# Patient Record
Sex: FEMALE | Race: WHITE | NOT HISPANIC OR LATINO | URBAN - METROPOLITAN AREA
[De-identification: names, ages, dates, MRNs, and addresses within clinical notes are randomized per-mention and may not be internally consistent; named-entity substitution may affect disease eponyms.]

---
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
## 2019-10-17 ENCOUNTER — HOSPITAL ENCOUNTER (EMERGENCY)
Facility: MEDICAL CENTER | Age: 19
End: 2019-10-17
Attending: EMERGENCY MEDICINE
Payer: COMMERCIAL

## 2019-10-17 VITALS
HEART RATE: 74 BPM | RESPIRATION RATE: 18 BRPM | TEMPERATURE: 98.6 F | DIASTOLIC BLOOD PRESSURE: 62 MMHG | WEIGHT: 130 LBS | SYSTOLIC BLOOD PRESSURE: 111 MMHG | OXYGEN SATURATION: 97 %

## 2019-10-17 DIAGNOSIS — T78.2XXA ANAPHYLAXIS, INITIAL ENCOUNTER: ICD-10-CM

## 2019-10-17 PROCEDURE — 96374 THER/PROPH/DIAG INJ IV PUSH: CPT

## 2019-10-17 PROCEDURE — 700111 HCHG RX REV CODE 636 W/ 250 OVERRIDE (IP)

## 2019-10-17 PROCEDURE — 96372 THER/PROPH/DIAG INJ SC/IM: CPT

## 2019-10-17 PROCEDURE — 96375 TX/PRO/DX INJ NEW DRUG ADDON: CPT

## 2019-10-17 PROCEDURE — 99285 EMERGENCY DEPT VISIT HI MDM: CPT

## 2019-10-17 PROCEDURE — 700111 HCHG RX REV CODE 636 W/ 250 OVERRIDE (IP): Performed by: EMERGENCY MEDICINE

## 2019-10-17 RX ORDER — EPINEPHRINE 1 MG/ML(1)
0.5 AMPUL (ML) INJECTION ONCE
Status: COMPLETED | OUTPATIENT
Start: 2019-10-17 | End: 2019-10-17

## 2019-10-17 RX ORDER — PREDNISONE 20 MG/1
20 TABLET ORAL DAILY
Qty: 6 TAB | Refills: 0 | Status: SHIPPED | OUTPATIENT
Start: 2019-10-18 | End: 2023-06-01

## 2019-10-17 RX ORDER — DIPHENHYDRAMINE HYDROCHLORIDE 50 MG/ML
50 INJECTION INTRAMUSCULAR; INTRAVENOUS ONCE
Status: COMPLETED | OUTPATIENT
Start: 2019-10-17 | End: 2019-10-17

## 2019-10-17 RX ORDER — FAMOTIDINE 20 MG/1
20 TABLET, FILM COATED ORAL 2 TIMES DAILY
Qty: 4 TAB | Refills: 0 | Status: SHIPPED | OUTPATIENT
Start: 2019-10-17 | End: 2019-10-19

## 2019-10-17 RX ORDER — METHYLPREDNISOLONE SODIUM SUCCINATE 125 MG/2ML
125 INJECTION, POWDER, LYOPHILIZED, FOR SOLUTION INTRAMUSCULAR; INTRAVENOUS ONCE
Status: COMPLETED | OUTPATIENT
Start: 2019-10-17 | End: 2019-10-17

## 2019-10-17 RX ORDER — ONDANSETRON 2 MG/ML
INJECTION INTRAMUSCULAR; INTRAVENOUS
Status: COMPLETED
Start: 2019-10-17 | End: 2019-10-17

## 2019-10-17 RX ORDER — ONDANSETRON 2 MG/ML
4 INJECTION INTRAMUSCULAR; INTRAVENOUS ONCE
Status: COMPLETED | OUTPATIENT
Start: 2019-10-17 | End: 2019-10-17

## 2019-10-17 RX ADMIN — FAMOTIDINE 20 MG: 10 INJECTION, SOLUTION INTRAVENOUS at 15:52

## 2019-10-17 RX ADMIN — METHYLPREDNISOLONE SODIUM SUCCINATE 125 MG: 125 INJECTION, POWDER, FOR SOLUTION INTRAMUSCULAR; INTRAVENOUS at 15:50

## 2019-10-17 RX ADMIN — DIPHENHYDRAMINE HYDROCHLORIDE 50 MG: 50 INJECTION, SOLUTION INTRAMUSCULAR; INTRAVENOUS at 15:50

## 2019-10-17 RX ADMIN — ONDANSETRON 4 MG: 2 INJECTION INTRAMUSCULAR; INTRAVENOUS at 16:00

## 2019-10-17 RX ADMIN — EPINEPHRINE 0.5 MG: 1 INJECTION INTRAMUSCULAR; INTRAVENOUS; SUBCUTANEOUS at 15:49

## 2019-10-17 NOTE — ED PROVIDER NOTES
ED Provider Note    Scribed for Ximena Colón M.D. by Holly Guzman. 10/17/2019, 3:49 PM.    Primary care provider: Pcp Pt States None  Means of arrival: Walk in  History obtained from: Patient  History limited by: None     CHIEF COMPLAINT  Chief Complaint   Patient presents with   • Allergic Reaction     HPI  Radhika Broussard is a 19 y.o. female who presents to the Emergency Department with an allergic reaction onset just prior to ED arrival after eating a pomegranate 1 hour ago. She states she felt fine for 25 minutes after consumption however subsequently began feeling nausea. She then experienced some throat swelling and difficulty breathing. Patient states she has no known allergies except to cats. She last ate pomegranate 8-10 months ago. Denies vomiting.     REVIEW OF SYSTEMS  Pertinent positives include allergic reaction, nausea, throat swelling, difficulty breathing.   Pertinent negatives include no emesis.     As above, all other systems reviewed and are negative.   See HPI for further details.     PAST MEDICAL HISTORY  Past Medical History:   Diagnosis Date   • Lyme disease      SURGICAL HISTORY  None noted    SOCIAL HISTORY  Social History     Tobacco Use   • Smoking status: Never Smoker   • Smokeless tobacco: Never Used   Substance Use Topics   • Alcohol use: No   • Drug use: No      Social History     Substance and Sexual Activity   Drug Use No       FAMILY HISTORY  History reviewed. No pertinent family history.    CURRENT MEDICATIONS  Home Medications     Reviewed by Divya Corral (Pharmacy Tech) on 10/17/19 at 1603  Med List Status: Complete   Medication Last Dose Status        Patient Juan Carlos Taking any Medications                       ALLERGIES  Allergies   Allergen Reactions   • Pomegranate [[place]] Anaphylaxis       PHYSICAL EXAM  VITAL SIGNS: /93   Pulse (!) 120   Temp 37.2 °C (99 °F) (Temporal)   Resp (!) 22   Wt 59 kg (130 lb)   LMP 09/14/2019   SpO2 96%   Vitals reviewed.    Pulse  Oximetry was obtained. It showed a reading of 96% on room air. I interpreted this as normal.  Consitutional: Well-developed, well-nourished.  Positive respiratory distress.  HENT: Normocephalic, right external ear normal, left external ear normal, oropharynx clear and moist. Edema to uvula and soft palate.   Eyes: Severe edema to bilateral upper and lower eyelids. Negative for: icterus.  Neck: Range of motion normal, supple. Negative for cervical adenopathy.  Cardiovascular: Normal rate, regular rhythm, heart sounds normal, intact distal pulses. Negative for: murmur, rub, gallop.  Pulmonary/Chest Wall: Moderate respiratory distress, tachypneic, inspiratory and expiratory wheezes. Negative for: rales, rhonchi.   Musculoskeletal: Normal range of motion. Negative for edema.  Normal gait   neurological: Alert and oriented x3. No focal deficits.  Skin: Warm, dry. Negative for rash.  Psych: Understandably anxious    COURSE & MEDICAL DECISION MAKING  Nursing notes, VS, PMSFHx reviewed in chart.    3:49 PM Patient seen and examined at bedside. The patient presents with anaphylactic reaction after eating pomegranate. She was emergently treated with 20 mg Pepcid, 125 mg Solu-medrol, 50 mg Benadryl, and 0.5 mg epinephrine injection. Patient reports decreased swelling to her eyes. We will continue to observe her in the ER until symptom resolution. She will additionally be treated with 4 mg Zofran.     4:19 PM Patient reevaluated at bedside. She is feeling much better.     5:13 PM Patient reevaluated at bedside. She is sleeping in bed. Lung sounds are clear to ascultation. We will continue to observe her to ensure no recurrent allergic reaction.     6:23 PM Patient reevaluated at bedside. She is sitting upright in bed. Patient reports she is feeling much better. Vitals are stable. I recommended her that she props herself up when sleeping tonight to decreased facial swelling. She will be discharged with prescription for EpiPen,  Pepcid 20 mg and Prednisone 20 mg and instructions on supportive care. Patient understands and is comfortable to discharge home. The patient will return for new or worsening symptoms such as throat swelling or difficulty breathing. She is stable at time of discharge. Her vitals prior to discharge are: /62   Pulse 74   Temp 37 °C (98.6 °F) (Temporal)   Resp 18   Wt 59 kg (130 lb)   LMP 09/14/2019   SpO2 97%       CRITICAL CARE  The very real possibilty of a deterioration of this patient's condition required the highest level of my preparedness for sudden, emergent intervention.  I provided critical care services, which included medication orders, frequent reevaluations of the patient's condition and response to treatment, ordering and reviewing test results, and discussing the case with various consultants.  The critical care time associated with the care of the patient was 35 minutes. Review chart for interventions. This time is exclusive of any other billable procedures.    DISPOSITION:  Patient will be discharged home in stable condition.    FOLLOW UP:  [facility], Emergency Dept  [address] [place] [id number] [phone]    As needed, If symptoms worsen      OUTPATIENT MEDICATIONS:  Discharge Medication List as of 10/17/2019  6:14 PM      START taking these medications    Details   predniSONE (DELTASONE) 20 MG Tab Take 1 Tab by mouth every day. Take 3 tabs p.o. daily x2 days, Disp-6 Tab, R-0, Normal      famotidine (PEPCID) 20 MG Tab Take 1 Tab by mouth 2 times a day for 2 days., Disp-4 Tab, R-0, Normal      EPINEPHrine 0.3 MG/0.3ML Solution Prefilled Syringe 0.3 mg by Intramuscular route Once PRN (for anaphylaxis) for up to 1 dose., Disp-2 Each, R-0, Normal           FINAL IMPRESSION  1. Anaphylaxis, initial encounter       Critical care time of 35 minutes. This time is exclusive of any other billable procedures.     I, Holly Guzman (Hannah), am scribing for, and in  the presence ofXimena M.D..  Electronically signed by: Holly Guzman (Scribe), 10/17/2019  I, Ximena Colón M.D. personally performed the services described in this documentation, as scribed by Holly Guzman in my presence, and it is both accurate and complete. C.     The note accurately reflects work and decisions made by me.  Ximena Colón  10/17/2019  9:08 PM

## 2019-10-17 NOTE — ED NOTES
Pt brought back from triage, c/o allergic reaction to pomegranate seeds. Denies hx of this. Pt had large amount of swelling to bilateral eyes, moderate increased WOB with inspiratory and expiratory wheezes noted. +cough    Pt a/o x4, speaking in 1-2 word sentences.

## 2023-05-23 ENCOUNTER — HOSPITAL ENCOUNTER (EMERGENCY)
Facility: MEDICAL CENTER | Age: 23
End: 2023-05-23
Attending: EMERGENCY MEDICINE

## 2023-05-23 VITALS
WEIGHT: 130 LBS | RESPIRATION RATE: 18 BRPM | HEART RATE: 59 BPM | SYSTOLIC BLOOD PRESSURE: 118 MMHG | HEIGHT: 65 IN | BODY MASS INDEX: 21.66 KG/M2 | OXYGEN SATURATION: 99 % | DIASTOLIC BLOOD PRESSURE: 76 MMHG | TEMPERATURE: 98.4 F

## 2023-05-23 DIAGNOSIS — T78.40XA ALLERGIC REACTION, INITIAL ENCOUNTER: ICD-10-CM

## 2023-05-23 PROCEDURE — 99284 EMERGENCY DEPT VISIT MOD MDM: CPT

## 2023-05-23 PROCEDURE — 700111 HCHG RX REV CODE 636 W/ 250 OVERRIDE (IP): Performed by: EMERGENCY MEDICINE

## 2023-05-23 PROCEDURE — 96374 THER/PROPH/DIAG INJ IV PUSH: CPT

## 2023-05-23 RX ORDER — DEXAMETHASONE SODIUM PHOSPHATE 4 MG/ML
10 INJECTION, SOLUTION INTRA-ARTICULAR; INTRALESIONAL; INTRAMUSCULAR; INTRAVENOUS; SOFT TISSUE ONCE
Status: COMPLETED | OUTPATIENT
Start: 2023-05-23 | End: 2023-05-23

## 2023-05-23 RX ADMIN — DEXAMETHASONE SODIUM PHOSPHATE 10 MG: 4 INJECTION INTRA-ARTICULAR; INTRALESIONAL; INTRAMUSCULAR; INTRAVENOUS; SOFT TISSUE at 17:58

## 2023-05-23 NOTE — ED TRIAGE NOTES
"Patient presents to the ER with the following complaints    Chief Complaint   Patient presents with    Allergic Reaction     Patient took 10 mg of liquid benadryl and 10 mg of liquid zyrtec prior to arrival. Aerosol dry shampoo was used just prior to onset of symptoms 2 and a half hours ago. Patient able to speak in complete sentences with mild to moderate swelling of tongue. Airway intact.      BP (!) 146/107   Pulse 100   Temp 36.9 °C (98.4 °F) (Temporal)   Resp 20   Ht 1.651 m (5' 5\")   Wt 59 kg (130 lb)   LMP 04/30/2023 (Approximate)   SpO2 100%   BMI 21.63 kg/m²       "

## 2023-05-24 NOTE — ED PROVIDER NOTES
ED Provider Note    CHIEF COMPLAINT  Chief Complaint   Patient presents with    Allergic Reaction     Patient took 10 mg of liquid benadryl and 10 mg of liquid zyrtec prior to arrival. Aerosol dry shampoo was used just prior to onset of symptoms 2 and a half hours ago. Patient able to speak in complete sentences with mild to moderate swelling of tongue. Airway intact.        EXTERNAL RECORDS REVIEWED  Outpatient Notes previous allergic reactions    HPI/ROS  LIMITATION TO HISTORY   Select: : None  OUTSIDE HISTORIAN(S):  None    Radhika Broussard is a 22 y.o. female who presents to the ED secondary to allergic reaction.  The patient states that after using some spray shampoo.  The patient has an extensive history of allergies.  The patient states that she started feeling very itchy in her throat, took 10 mg of Benadryl, 10 mg of Zyrtec.  She took a nap and when she woke up she felt like she had hives on her tongue.  Patient states her symptoms have then improved, symptoms started approximately 2 hours ago.  No other hives, chest pain, shortness of breath.    PAST MEDICAL HISTORY   has a past medical history of Lyme disease.    SURGICAL HISTORY  patient denies any surgical history    FAMILY HISTORY  History reviewed. No pertinent family history.    SOCIAL HISTORY  Social History     Tobacco Use    Smoking status: Never    Smokeless tobacco: Never   Substance and Sexual Activity    Alcohol use: Yes     Alcohol/week: 1.2 oz     Types: 2 Cans of beer per week    Drug use: No    Sexual activity: Not on file       CURRENT MEDICATIONS  Home Medications       Reviewed by Gaurang Lima R.N. (Registered Nurse) on 05/23/23 at 1636  Med List Status: Not Addressed     Medication Last Dose Status   EPINEPHrine 0.3 MG/0.3ML Solution Prefilled Syringe  Active   predniSONE (DELTASONE) 20 MG Tab  Active                    ALLERGIES  Allergies   Allergen Reactions    Pomegranate [[place]] Anaphylaxis       PHYSICAL EXAM  VITAL SIGNS:  "BP (!) 146/107   Pulse 100   Temp 36.9 °C (98.4 °F) (Temporal)   Resp 20   Ht 1.651 m (5' 5\")   Wt 59 kg (130 lb)   LMP 04/30/2023 (Approximate)   SpO2 100%   BMI 21.63 kg/m²    Well-developed well-nourished 22-year-old white female who appears in mild distress  Atraumatic, normocephalic, oropharynx with no lesions seen, no uvular edema, tonsils are not erythematous  Neck supple  Chest clear to auscultation  Regular rhythm  Abdomen soft nontender  Neuro awake screech is clear  Skin no hives seen    INITIAL ASSESSMENT, COURSE AND PLAN  Care Narrative: Patient with allergic reaction, symptoms of pretty much gone away now.  We will give the patient a dose of Decadron, she will take Benadryl at home, she has a EpiPen at home.  We will have the patient return with worsening symptoms.      DISPOSITION AND DISCUSSIONS    Escalation of care considered, and ultimately not performed:blood analysis    FINAL DIAGNOSIS  1. Allergic reaction, initial encounter           Electronically signed by: Samuel Nix M.D., 5/23/2023 6:00 PM      "

## 2023-06-01 ENCOUNTER — HOSPITAL ENCOUNTER (EMERGENCY)
Facility: MEDICAL CENTER | Age: 23
End: 2023-06-01
Attending: EMERGENCY MEDICINE
Payer: COMMERCIAL

## 2023-06-01 VITALS
HEART RATE: 62 BPM | DIASTOLIC BLOOD PRESSURE: 84 MMHG | OXYGEN SATURATION: 95 % | BODY MASS INDEX: 26.23 KG/M2 | SYSTOLIC BLOOD PRESSURE: 131 MMHG | RESPIRATION RATE: 16 BRPM | WEIGHT: 133.6 LBS | HEIGHT: 60 IN | TEMPERATURE: 98 F

## 2023-06-01 DIAGNOSIS — T78.40XA ALLERGIC REACTION, INITIAL ENCOUNTER: ICD-10-CM

## 2023-06-01 PROCEDURE — 99282 EMERGENCY DEPT VISIT SF MDM: CPT

## 2023-06-01 RX ORDER — ALBUTEROL SULFATE 90 UG/1
1-2 INHALANT RESPIRATORY (INHALATION) EVERY 4 HOURS PRN
COMMUNITY

## 2023-06-01 RX ORDER — DIPHENHYDRAMINE HCL 25 MG
25 TABLET ORAL EVERY 6 HOURS PRN
COMMUNITY

## 2023-06-01 RX ORDER — CETIRIZINE HYDROCHLORIDE 10 MG/1
10-20 TABLET ORAL 2 TIMES DAILY PRN
COMMUNITY

## 2023-06-01 RX ORDER — PREDNISONE 20 MG/1
40 TABLET ORAL DAILY
Qty: 10 TABLET | Refills: 0 | Status: SHIPPED | OUTPATIENT
Start: 2023-06-01 | End: 2023-06-06

## 2023-06-01 RX ORDER — FEXOFENADINE HCL 180 MG/1
180 TABLET ORAL DAILY
COMMUNITY

## 2023-06-01 RX ORDER — FLUTICASONE PROPIONATE AND SALMETEROL 113; 14 UG/1; UG/1
1 POWDER, METERED RESPIRATORY (INHALATION) 2 TIMES DAILY
COMMUNITY

## 2023-06-01 RX ORDER — FAMOTIDINE 20 MG/1
20-40 TABLET, FILM COATED ORAL 2 TIMES DAILY PRN
COMMUNITY

## 2023-06-01 NOTE — DISCHARGE INSTRUCTIONS
Take Benadryl as needed for further itching.  Utilize your EpiPen for difficulty with breathing or swallowing.

## 2023-06-01 NOTE — ED PROVIDER NOTES
ED Provider Note    CHIEF COMPLAINT  Chief Complaint   Patient presents with    Allergic Reaction     Started about an hour ago. Pt has taken benadryl, 4 zyrtec, 1 allegra and 2 pepcid without relief. Pt endorses sob and throat tightness and itchiness.         EXTERNAL RECORDS REVIEWED  Other reviewed the patient's records from 23 May when she was seen here for suspected allergic reaction at that time as well.    HPI/ROS    Radhika Broussard is a 22 y.o. female who presents for suspected allergic reaction.  The patient states that she started having allergic reaction about an hour prior to arrival.  The patient took Benadryl, 4 Zyrtec, 1 Allegra, and 2 Pepcid with no relief.  She states she feels like she is having hard time breathing as well as swelling.  She also has some numbness to the right leg.  She states that she has a known history of pollen allergies.  She does have an EpiPen but she did not utilize the EpiPen.  She states she is otherwise healthy.    PAST MEDICAL HISTORY   has a past medical history of Lyme disease and Patient denies medical problems.    SURGICAL HISTORY  patient denies any surgical history    FAMILY HISTORY  History reviewed. No pertinent family history.    SOCIAL HISTORY  Social History     Tobacco Use    Smoking status: Never    Smokeless tobacco: Never   Substance and Sexual Activity    Alcohol use: Yes     Alcohol/week: 1.2 oz     Types: 2 Cans of beer per week    Drug use: No    Sexual activity: Not on file       CURRENT MEDICATIONS  Home Medications       Reviewed by Andrea Whitney R.N. (Registered Nurse) on 06/01/23 at 1459  Med List Status: Not Addressed     Medication Last Dose Status   EPINEPHrine 0.3 MG/0.3ML Solution Prefilled Syringe  Active   predniSONE (DELTASONE) 20 MG Tab  Active                    ALLERGIES  Allergies   Allergen Reactions    Pomegranate [[place]] Anaphylaxis       PHYSICAL EXAM  VITAL SIGNS: Pulse 77   Temp 36.7 °C (98.1 °F) (Temporal)   Resp (!) 24     1.524 m (5')   Wt 60.6 kg (133 lb 9.6 oz)   LMP 04/30/2023 (Approximate)   SpO2 98%   Breastfeeding No   BMI 26.09 kg/m²    In general the patient appears anxious but nontoxic    Nares and mouth are moist with no uvular edema nor erythema    Pulmonary the patient's lungs are clear to auscultation bilaterally with no tachypnea    Cardiovascular S1-S2 with a regular rate and rhythm    GI abdomen soft    Skin no rashes    Extremities no edema    Neurologic exam is grossly intact      COURSE & MEDICAL DECISION MAKING    ED Observation Status? Yes; I am placing the patient in to an observation status due to a diagnostic uncertainty as well as therapeutic intensity. Patient placed in observation status at 1500 PM, 6/1/2023.     Observation plan is as follows: The patient presents after suspected allergic reaction and she will be observed that she was treated effectively in the field.    1610 the patient remains asymptomatic at this time and has had significant improvement.  I suspect the paresthesias could have been from a hyperventilation syndrome as she was pretty anxious on arrival.  She continues to have no uvular edema nor wheezing.  She will be discharged home with instructions to take Benadryl as needed and also utilize her EpiPen if she starts having difficulty with breathing or swallowing.  I will place the patient on a 5-day course of steroids to help decrease the chance of recurrence.    Upon Reevaluation, the patient's condition has: Improved; and will be discharged.    Patient discharged from ED Observation status at 1610 (Time) 6/1/23 (Date).       FINAL DIAGNOSIS  Allergic reaction    Disposition  The patient will be discharged in stable condition       Electronically signed by: Juan George M.D., 6/1/2023 3:06 PM

## 2023-06-01 NOTE — ED TRIAGE NOTES
Bib mother for above complaints.     Chief Complaint   Patient presents with    Allergic Reaction     Started about an hour ago. Pt has taken benadryl, 4 zyrtec, 1 allegra and 2 pepcid without relief. Pt endorses sob and throat tightness and itchiness.       Pulse 77   Temp 36.7 °C (98.1 °F) (Temporal)   Resp (!) 24   Ht 1.524 m (5')   Wt 60.6 kg (133 lb 9.6 oz)   LMP 04/30/2023 (Approximate)   SpO2 98%   Breastfeeding No   BMI 26.09 kg/m²